# Patient Record
Sex: FEMALE | Race: WHITE | NOT HISPANIC OR LATINO
[De-identification: names, ages, dates, MRNs, and addresses within clinical notes are randomized per-mention and may not be internally consistent; named-entity substitution may affect disease eponyms.]

---

## 2020-08-13 ENCOUNTER — APPOINTMENT (OUTPATIENT)
Dept: OBGYN | Facility: CLINIC | Age: 35
End: 2020-08-13
Payer: COMMERCIAL

## 2020-08-13 VITALS
HEIGHT: 67 IN | SYSTOLIC BLOOD PRESSURE: 120 MMHG | WEIGHT: 170 LBS | BODY MASS INDEX: 26.68 KG/M2 | DIASTOLIC BLOOD PRESSURE: 84 MMHG | RESPIRATION RATE: 18 BRPM | OXYGEN SATURATION: 98 %

## 2020-08-13 DIAGNOSIS — N64.9 DISORDER OF BREAST, UNSPECIFIED: ICD-10-CM

## 2020-08-13 PROBLEM — Z00.00 ENCOUNTER FOR PREVENTIVE HEALTH EXAMINATION: Status: ACTIVE | Noted: 2020-08-13

## 2020-08-13 PROCEDURE — 99213 OFFICE O/P EST LOW 20 MIN: CPT

## 2020-08-13 NOTE — CHIEF COMPLAINT
[FreeTextEntry1] : pt presents with c/o ? discoloration in left breast and is concerned about inflammatory breast ca. pt had mammo/sono in january which was birads1 [Follow Up] : follow up GYN visit

## 2020-08-13 NOTE — PHYSICAL EXAM
[Awake] : awake [Alert] : alert [Acute Distress] : no acute distress [LAD] : lymphadenopathy [Examination Of The Breasts] : a normal appearance [Normal] : normal [No Masses] : no breast masses were palpable

## 2020-08-13 NOTE — DISCUSSION/SUMMARY
[FreeTextEntry1] : reviewed mammo and sono with pt as well as normal breast exam. pt is very anxious regarding breast cancer. advised to see breast surgeon for consult and possible mri/brca screening. Dr. Rivera's information provided

## 2020-08-21 ENCOUNTER — APPOINTMENT (OUTPATIENT)
Dept: BREAST CENTER | Facility: CLINIC | Age: 35
End: 2020-08-21
Payer: COMMERCIAL

## 2020-08-21 VITALS
DIASTOLIC BLOOD PRESSURE: 74 MMHG | HEIGHT: 67 IN | TEMPERATURE: 97.9 F | HEART RATE: 103 BPM | SYSTOLIC BLOOD PRESSURE: 113 MMHG | BODY MASS INDEX: 25.9 KG/M2 | WEIGHT: 165 LBS

## 2020-08-21 DIAGNOSIS — Z72.89 OTHER PROBLEMS RELATED TO LIFESTYLE: ICD-10-CM

## 2020-08-21 DIAGNOSIS — N62 HYPERTROPHY OF BREAST: ICD-10-CM

## 2020-08-21 PROCEDURE — 99203 OFFICE O/P NEW LOW 30 MIN: CPT

## 2020-08-21 NOTE — HISTORY OF PRESENT ILLNESS
[FreeTextEntry1] : Pt is a 33 y/o female here for consultation referred by Dr. Darryl Marcial for breast discoloration for the last week and a half and mild pain 3/10 not requiring pain medication. Denies any breast lesions, discharge or masses. No fever, signs of infection or trauma to breast. Pt is adopted and not aware of FHx.\par Pt is anxious that she may have inflammatory breast cancer. \par --1/10/20 ZP Olayinka DmmgT/US: baseline, no susp findings olayinka .BR1. Clinical management rec for breast pain advised.\par

## 2020-08-21 NOTE — DATA REVIEWED
[FreeTextEntry1] : 1/10/20 ZP Olayinka DmmgT/US: baseline, no susp findings olayinka .BR1. Clinical management rec for breast pain advised.

## 2020-08-21 NOTE — REASON FOR VISIT
[Consultation] : a consultation visit [FreeTextEntry1] : Breast pain/discoloration/redness to Right breast.

## 2020-08-21 NOTE — PHYSICAL EXAM
[Bra Size: ___] : Bra Size: [unfilled] [Atraumatic] : atraumatic [Normocephalic] : normocephalic [Supple] : supple [No Supraclavicular Adenopathy] : no supraclavicular adenopathy [No Thyromegaly] : no thyromegaly [Examined in the supine and seated position] : examined in the supine and seated position [No dominant masses] : no dominant masses left breast [No dominant masses] : no dominant masses in right breast  [No Nipple Retraction] : no left nipple retraction [No Nipple Discharge] : no left nipple discharge [No Axillary Lymphadenopathy] : no left axillary lymphadenopathy [No Edema] : no edema [No Ulceration] : no ulceration [No Rashes] : no rashes [Asymmetrical] : asymmetrical [Full ROM] : full range of motion [No Swelling] : no swelling [Breast Nipple Retraction] : nipples not retracted [Breast Nipple Inversion] : nipples not inverted [Breast Nipple Flattening] : nipples not flattened [Breast Abnormal Lactation (Galactorrhea)] : no galactorrhea [Breast Nipple Fissures] : nipples not fissured [Breast Abnormal Secretion Bloody Fluid] : no bloody discharge [Breast Abnormal Secretion Opalescent Fluid] : no milky discharge [Breast Abnormal Secretion Serous Fluid] : no serous discharge [de-identified] : bilat large pendulous breasts, R<L overall but does not appear acute change.  [de-identified] : no edema, erythema or underlying mass. medial aspect slightly pinker but appears to be along bra lines.

## 2020-08-21 NOTE — ASSESSMENT
[FreeTextEntry1] : Pt is a 33 y/o female here for consultation referred by Dr. Darryl Marcial for breast discoloration for the last week and a half and mild pain 3/10 not requiring pain medication. Denies any breast lesions, discharge or masses. No fever, signs of infection or trauma to breast. Pt is adopted and not aware of FHx.\par Pt is anxious that she may have inflammatory breast cancer. \par --1/10/20 ZP Olayinka DmmgT/US: baseline, no susp findings olayinka .BR1. Clinical management rec for breast pain advised.\par CBE: BIlat pendulous breasts, R<L overall but no edema, erythema, masses.  No evidence of IBC. Slight pinker discoloration along bra contact lines.\par Pt reassured, no suspicious findings.  Slight pinkish discoloration may be because the bra cup on the left may be too small. Discussed her breasts are asymmetrical overall but does not appear to be an acute change. No evidence of IBC. Pt is adopted and does not know her genetic hx so offered genetic testing and COLOR was done. \par Given anxiety and discomfort, can do left DMMG and u/s to see if any radiologic changes in skin thickness. Further f.u pending results of studies.

## 2020-08-21 NOTE — PAST MEDICAL HISTORY
[Menarche Age ____] : age at menarche was [unfilled] [Definite ___ (Date)] : the last menstrual period was [unfilled] [Living ___] : Living: [unfilled] [Total Preg ___] : G[unfilled] [Age At Live Birth ___] : Age at live birth: [unfilled] [Live Births ___] : P[unfilled]  [History of Hormone Replacement Treatment] : has no history of hormone replacement treatment [FreeTextEntry8] : no [FreeTextEntry7] : 1 month

## 2020-08-21 NOTE — REVIEW OF SYSTEMS
[Negative] : Heme/Lymph [Breast Pain] : breast pain [de-identified] : Left breast pain and discoloration

## 2020-09-14 ENCOUNTER — APPOINTMENT (OUTPATIENT)
Dept: ULTRASOUND IMAGING | Facility: CLINIC | Age: 35
End: 2020-09-14
Payer: COMMERCIAL

## 2020-09-14 ENCOUNTER — RESULT REVIEW (OUTPATIENT)
Age: 35
End: 2020-09-14

## 2020-09-14 ENCOUNTER — APPOINTMENT (OUTPATIENT)
Dept: MAMMOGRAPHY | Facility: CLINIC | Age: 35
End: 2020-09-14
Payer: COMMERCIAL

## 2020-09-14 ENCOUNTER — TRANSCRIPTION ENCOUNTER (OUTPATIENT)
Age: 35
End: 2020-09-14

## 2020-09-14 PROCEDURE — 77065 DX MAMMO INCL CAD UNI: CPT | Mod: LT

## 2020-09-14 PROCEDURE — G0279: CPT | Mod: LT

## 2020-09-14 PROCEDURE — 76641 ULTRASOUND BREAST COMPLETE: CPT | Mod: LT

## 2020-10-06 ENCOUNTER — NON-APPOINTMENT (OUTPATIENT)
Age: 35
End: 2020-10-06

## 2020-10-08 ENCOUNTER — APPOINTMENT (OUTPATIENT)
Dept: BREAST CENTER | Facility: CLINIC | Age: 35
End: 2020-10-08
Payer: COMMERCIAL

## 2020-10-08 VITALS
SYSTOLIC BLOOD PRESSURE: 142 MMHG | WEIGHT: 165 LBS | DIASTOLIC BLOOD PRESSURE: 96 MMHG | HEART RATE: 91 BPM | TEMPERATURE: 97.6 F | BODY MASS INDEX: 25.9 KG/M2 | HEIGHT: 67 IN

## 2020-10-08 DIAGNOSIS — N64.4 MASTODYNIA: ICD-10-CM

## 2020-10-08 PROCEDURE — 99213 OFFICE O/P EST LOW 20 MIN: CPT

## 2020-10-08 NOTE — PAST MEDICAL HISTORY
[History of Hormone Replacement Treatment] : has no history of hormone replacement treatment [FreeTextEntry7] : 1 month [FreeTextEntry8] : no

## 2020-10-08 NOTE — ASSESSMENT
[FreeTextEntry1] : Pt is a 35 y/o female here for here for genetic testing. Prior sample unsatisfactory. Denies any breast lesions, discharge or masses. No new breast complaints. No reported breast discoloration or c/o pain.\par Pt is adopted and not aware of FHx.\par Pt is anxious that she may have inflammatory breast cancer. \par \par --9/14/20 Janelle L Dmmg/US: compared to 1/10/20, dense, no susp findings on mmg/US. BR1.\par --1/10/20 ZP Olayinka DmmgT/US: baseline, no susp findings olayinka .BR1. Clinical management rec for breast pain advised.\par Genetic testing procedure reviewed.

## 2020-10-08 NOTE — HISTORY OF PRESENT ILLNESS
[FreeTextEntry1] : Pt is a 33 y/o female here for here for genetic testing. Prior sample unsatisfactory. Denies any breast lesions, discharge or masses. No new breast complaints. No reported breast discoloration or c/o pain.\par Pt is adopted and not aware of FHx.\par Pt is anxious that she may have inflammatory breast cancer. \par \par --9/14/20 Janelle L Dmmg/US: compared to 1/10/20, dense, no susp findings on mmg/US. BR1.\par --1/10/20 ZP Olayinka DmmgT/US: baseline, no susp findings olayinka .BR1. Clinical management rec for breast pain advised.

## 2020-10-08 NOTE — DATA REVIEWED
[FreeTextEntry1] : 9/14/20 Janelle ALVAREZ Dmmg/US: compared to 1/10/20, dense, no susp findings on mmg/US. BR1.\par \par

## 2021-02-03 ENCOUNTER — APPOINTMENT (OUTPATIENT)
Dept: ANTEPARTUM | Facility: CLINIC | Age: 36
End: 2021-02-03
Payer: COMMERCIAL

## 2021-02-03 ENCOUNTER — APPOINTMENT (OUTPATIENT)
Dept: OBGYN | Facility: CLINIC | Age: 36
End: 2021-02-03
Payer: COMMERCIAL

## 2021-02-03 ENCOUNTER — ASOB RESULT (OUTPATIENT)
Age: 36
End: 2021-02-03

## 2021-02-03 LAB
HCG UR QL: POSITIVE
QUALITY CONTROL: YES

## 2021-02-03 PROCEDURE — 76801 OB US < 14 WKS SINGLE FETUS: CPT

## 2021-02-03 PROCEDURE — 99214 OFFICE O/P EST MOD 30 MIN: CPT

## 2021-02-03 PROCEDURE — 81025 URINE PREGNANCY TEST: CPT

## 2021-02-03 PROCEDURE — 76817 TRANSVAGINAL US OBSTETRIC: CPT

## 2021-02-03 PROCEDURE — 99072 ADDL SUPL MATRL&STAF TM PHE: CPT

## 2021-02-03 NOTE — PLAN
[FreeTextEntry1] : had long conversation with pt regarding sab and possible causes. pt offered expectant management vs suction d&c. r/b of both advised\par \par pt to discuss with  and call with decision

## 2021-02-17 ENCOUNTER — APPOINTMENT (OUTPATIENT)
Dept: OBGYN | Facility: CLINIC | Age: 36
End: 2021-02-17
Payer: COMMERCIAL

## 2021-02-17 VITALS — TEMPERATURE: 97 F

## 2021-02-17 DIAGNOSIS — O02.1 MISSED ABORTION: ICD-10-CM

## 2021-02-17 PROCEDURE — 99214 OFFICE O/P EST MOD 30 MIN: CPT

## 2021-02-17 PROCEDURE — 99072 ADDL SUPL MATRL&STAF TM PHE: CPT

## 2021-02-17 NOTE — PLAN
[FreeTextEntry1] : hcg to lab\par again discussed sab and potential causes. again offered d&e which pt is declining. will call with blood results

## 2021-02-18 LAB — HCG SERPL-MCNC: 7266 MIU/ML

## 2021-02-24 ENCOUNTER — ASOB RESULT (OUTPATIENT)
Age: 36
End: 2021-02-24

## 2021-02-24 ENCOUNTER — APPOINTMENT (OUTPATIENT)
Dept: ANTEPARTUM | Facility: CLINIC | Age: 36
End: 2021-02-24
Payer: COMMERCIAL

## 2021-02-24 ENCOUNTER — APPOINTMENT (OUTPATIENT)
Dept: OBGYN | Facility: CLINIC | Age: 36
End: 2021-02-24
Payer: COMMERCIAL

## 2021-02-24 DIAGNOSIS — O03.9 COMPLETE OR UNSPECIFIED SPONTANEOUS ABORTION W/OUT COMPLICATION: ICD-10-CM

## 2021-02-24 PROCEDURE — 99072 ADDL SUPL MATRL&STAF TM PHE: CPT

## 2021-02-24 PROCEDURE — 99213 OFFICE O/P EST LOW 20 MIN: CPT

## 2021-02-24 PROCEDURE — 76830 TRANSVAGINAL US NON-OB: CPT | Mod: 59

## 2021-02-24 PROCEDURE — 76856 US EXAM PELVIC COMPLETE: CPT | Mod: 59

## 2021-02-24 NOTE — PLAN
[FreeTextEntry1] : caprice discussed. precautions reviewed. will get blood work and call with results

## 2021-02-24 NOTE — HISTORY OF PRESENT ILLNESS
[FreeTextEntry1] : pt states she passed a large amount of tissue over the weekend. still has small amt of blood but mostly subsiding

## 2021-02-25 LAB — ABO + RH PNL BLD: NORMAL

## 2021-02-26 LAB — HCG SERPL-MCNC: 299 MIU/ML

## 2021-06-01 ENCOUNTER — NON-APPOINTMENT (OUTPATIENT)
Age: 36
End: 2021-06-01

## 2023-02-08 ENCOUNTER — ASOB RESULT (OUTPATIENT)
Age: 38
End: 2023-02-08

## 2023-02-08 ENCOUNTER — APPOINTMENT (OUTPATIENT)
Dept: OBGYN | Facility: CLINIC | Age: 38
End: 2023-02-08
Payer: COMMERCIAL

## 2023-02-08 ENCOUNTER — APPOINTMENT (OUTPATIENT)
Dept: ANTEPARTUM | Facility: CLINIC | Age: 38
End: 2023-02-08
Payer: COMMERCIAL

## 2023-02-08 VITALS
WEIGHT: 185 LBS | DIASTOLIC BLOOD PRESSURE: 70 MMHG | BODY MASS INDEX: 29.03 KG/M2 | SYSTOLIC BLOOD PRESSURE: 132 MMHG | HEIGHT: 67 IN

## 2023-02-08 DIAGNOSIS — N92.6 IRREGULAR MENSTRUATION, UNSPECIFIED: ICD-10-CM

## 2023-02-08 PROCEDURE — 76801 OB US < 14 WKS SINGLE FETUS: CPT

## 2023-02-08 PROCEDURE — 99214 OFFICE O/P EST MOD 30 MIN: CPT

## 2023-02-08 NOTE — PLAN
[FreeTextEntry1] : pregnancy precautions reviewed. sono discussed. advised pnv\par f/u 1 week for nob or prn. unsure is she wants nipt or not

## 2023-02-14 ENCOUNTER — NON-APPOINTMENT (OUTPATIENT)
Age: 38
End: 2023-02-14

## 2023-02-15 ENCOUNTER — APPOINTMENT (OUTPATIENT)
Dept: OBGYN | Facility: CLINIC | Age: 38
End: 2023-02-15
Payer: COMMERCIAL

## 2023-02-15 ENCOUNTER — APPOINTMENT (OUTPATIENT)
Dept: ANTEPARTUM | Facility: CLINIC | Age: 38
End: 2023-02-15

## 2023-02-15 VITALS
WEIGHT: 185 LBS | DIASTOLIC BLOOD PRESSURE: 90 MMHG | BODY MASS INDEX: 29.03 KG/M2 | SYSTOLIC BLOOD PRESSURE: 150 MMHG | HEIGHT: 67 IN

## 2023-02-15 VITALS — SYSTOLIC BLOOD PRESSURE: 132 MMHG | DIASTOLIC BLOOD PRESSURE: 88 MMHG

## 2023-02-15 DIAGNOSIS — Z34.91 ENCOUNTER FOR SUPERVISION OF NORMAL PREGNANCY, UNSPECIFIED, FIRST TRIMESTER: ICD-10-CM

## 2023-02-15 PROCEDURE — 0501F PRENATAL FLOW SHEET: CPT

## 2023-02-16 LAB
ABO + RH PNL BLD: NORMAL
BASOPHILS # BLD AUTO: 0.03 K/UL
BASOPHILS NFR BLD AUTO: 0.3 %
BLD GP AB SCN SERPL QL: NORMAL
EOSINOPHIL # BLD AUTO: 0.03 K/UL
EOSINOPHIL NFR BLD AUTO: 0.3 %
HCT VFR BLD CALC: 43.6 %
HGB BLD-MCNC: 14.8 G/DL
HIV1+2 AB SPEC QL IA.RAPID: NONREACTIVE
IMM GRANULOCYTES NFR BLD AUTO: 0.3 %
LYMPHOCYTES # BLD AUTO: 1.58 K/UL
LYMPHOCYTES NFR BLD AUTO: 14.6 %
MAN DIFF?: NORMAL
MCHC RBC-ENTMCNC: 31.6 PG
MCHC RBC-ENTMCNC: 33.9 GM/DL
MCV RBC AUTO: 93.2 FL
MONOCYTES # BLD AUTO: 0.4 K/UL
MONOCYTES NFR BLD AUTO: 3.7 %
NEUTROPHILS # BLD AUTO: 8.74 K/UL
NEUTROPHILS NFR BLD AUTO: 80.8 %
PLATELET # BLD AUTO: 285 K/UL
RBC # BLD: 4.68 M/UL
RBC # FLD: 12.3 %
WBC # FLD AUTO: 10.81 K/UL

## 2023-02-17 LAB
HBV SURFACE AB SER QL: REACTIVE
HBV SURFACE AG SER QL: NONREACTIVE
HCV AB SER QL: NONREACTIVE
HCV S/CO RATIO: 0.05 S/CO
HGB A MFR BLD: 97.4 %
HGB A2 MFR BLD: 2.6 %
HGB FRACT BLD-IMP: NORMAL
MEV IGG FLD QL IA: 18.1 AU/ML
MEV IGG+IGM SER-IMP: POSITIVE
RUBV IGG FLD-ACNC: 1 INDEX
RUBV IGG SER-IMP: NORMAL
T GONDII AB SER-IMP: NEGATIVE
T GONDII AB SER-IMP: NEGATIVE
T GONDII IGG SER QL: <3 IU/ML
T GONDII IGM SER QL: <3 AU/ML
T PALLIDUM AB SER QL IA: NEGATIVE
VZV AB TITR SER: POSITIVE
VZV IGG SER IF-ACNC: 956.1 INDEX

## 2023-02-23 LAB
B19V IGG SER QL IA: 0.45 INDEX
B19V IGG+IGM SER-IMP: NEGATIVE
B19V IGG+IGM SER-IMP: NORMAL
B19V IGM FLD-ACNC: 0.14 INDEX
B19V IGM SER-ACNC: NEGATIVE

## 2023-03-01 LAB
AR GENE MUT ANL BLD/T: NEGATIVE
CFTR MUT TESTED BLD/T: NEGATIVE
GENE DIS ANL CARRIER INTERP-IMP: NEGATIVE
SMN1 GENE MUT ANL BLD/T: NORMAL

## 2023-03-14 ENCOUNTER — APPOINTMENT (OUTPATIENT)
Dept: OBGYN | Facility: CLINIC | Age: 38
End: 2023-03-14
Payer: COMMERCIAL

## 2023-03-14 VITALS
SYSTOLIC BLOOD PRESSURE: 140 MMHG | DIASTOLIC BLOOD PRESSURE: 70 MMHG | WEIGHT: 188 LBS | BODY MASS INDEX: 29.51 KG/M2 | HEIGHT: 67 IN

## 2023-03-14 DIAGNOSIS — Z34.92 ENCOUNTER FOR SUPERVISION OF NORMAL PREGNANCY, UNSPECIFIED, SECOND TRIMESTER: ICD-10-CM

## 2023-03-14 PROCEDURE — 0502F SUBSEQUENT PRENATAL CARE: CPT

## 2023-03-19 LAB
AFP MOM: 1.15
AFP VALUE: 37.7 NG/ML
ALPHA FETOPROTEIN SERUM COMMENT: NORMAL
ALPHA FETOPROTEIN SERUM INTERPRETATION: NORMAL
ALPHA FETOPROTEIN SERUM RESULTS: NORMAL
ALPHA FETOPROTEIN SERUM TEST RESULTS: NORMAL
GESTATIONAL AGE BASED ON: NORMAL
GESTATIONAL AGE ON COLLECTION DATE: 16.7 WEEKS
INSULIN DEP DIABETES: NO
MATERNAL AGE AT EDD AFP: 39.7 YR
MULTIPLE GESTATION: NO
OSBR RISK 1 IN: 7603
RACE: NORMAL
WEIGHT AFP: 188 LBS

## 2023-04-12 ENCOUNTER — ASOB RESULT (OUTPATIENT)
Age: 38
End: 2023-04-12

## 2023-04-12 ENCOUNTER — APPOINTMENT (OUTPATIENT)
Dept: OBGYN | Facility: CLINIC | Age: 38
End: 2023-04-12
Payer: COMMERCIAL

## 2023-04-12 ENCOUNTER — APPOINTMENT (OUTPATIENT)
Dept: ANTEPARTUM | Facility: CLINIC | Age: 38
End: 2023-04-12
Payer: COMMERCIAL

## 2023-04-12 VITALS
WEIGHT: 187 LBS | DIASTOLIC BLOOD PRESSURE: 70 MMHG | SYSTOLIC BLOOD PRESSURE: 110 MMHG | BODY MASS INDEX: 29.35 KG/M2 | HEIGHT: 67 IN

## 2023-04-12 PROCEDURE — 76811 OB US DETAILED SNGL FETUS: CPT

## 2023-04-12 PROCEDURE — 76817 TRANSVAGINAL US OBSTETRIC: CPT | Mod: 59

## 2023-04-12 PROCEDURE — 0502F SUBSEQUENT PRENATAL CARE: CPT

## 2023-04-14 ENCOUNTER — NON-APPOINTMENT (OUTPATIENT)
Age: 38
End: 2023-04-14

## 2023-05-10 ENCOUNTER — APPOINTMENT (OUTPATIENT)
Dept: OBGYN | Facility: CLINIC | Age: 38
End: 2023-05-10
Payer: COMMERCIAL

## 2023-05-10 VITALS
SYSTOLIC BLOOD PRESSURE: 128 MMHG | DIASTOLIC BLOOD PRESSURE: 70 MMHG | BODY MASS INDEX: 29.82 KG/M2 | HEIGHT: 67 IN | WEIGHT: 190 LBS

## 2023-05-10 PROCEDURE — 0502F SUBSEQUENT PRENATAL CARE: CPT

## 2023-05-31 ENCOUNTER — APPOINTMENT (OUTPATIENT)
Dept: OBGYN | Facility: CLINIC | Age: 38
End: 2023-05-31
Payer: COMMERCIAL

## 2023-05-31 ENCOUNTER — LABORATORY RESULT (OUTPATIENT)
Age: 38
End: 2023-05-31

## 2023-05-31 ENCOUNTER — APPOINTMENT (OUTPATIENT)
Dept: ANTEPARTUM | Facility: CLINIC | Age: 38
End: 2023-05-31
Payer: COMMERCIAL

## 2023-05-31 ENCOUNTER — ASOB RESULT (OUTPATIENT)
Age: 38
End: 2023-05-31

## 2023-05-31 VITALS
DIASTOLIC BLOOD PRESSURE: 70 MMHG | HEIGHT: 67 IN | WEIGHT: 191 LBS | SYSTOLIC BLOOD PRESSURE: 150 MMHG | BODY MASS INDEX: 29.98 KG/M2

## 2023-05-31 PROCEDURE — 76816 OB US FOLLOW-UP PER FETUS: CPT

## 2023-05-31 PROCEDURE — 0502F SUBSEQUENT PRENATAL CARE: CPT

## 2023-06-01 LAB
ABO + RH PNL BLD: NORMAL
GLUCOSE 1H P 100 G GLC PO SERPL-MCNC: 140 MG/DL

## 2023-06-14 ENCOUNTER — APPOINTMENT (OUTPATIENT)
Dept: OBGYN | Facility: CLINIC | Age: 38
End: 2023-06-14
Payer: COMMERCIAL

## 2023-06-14 VITALS
BODY MASS INDEX: 30.29 KG/M2 | WEIGHT: 193 LBS | DIASTOLIC BLOOD PRESSURE: 68 MMHG | SYSTOLIC BLOOD PRESSURE: 120 MMHG | HEIGHT: 67 IN

## 2023-06-14 PROCEDURE — 0502F SUBSEQUENT PRENATAL CARE: CPT

## 2023-06-28 ENCOUNTER — APPOINTMENT (OUTPATIENT)
Dept: OBGYN | Facility: CLINIC | Age: 38
End: 2023-06-28
Payer: COMMERCIAL

## 2023-06-28 VITALS
BODY MASS INDEX: 30.76 KG/M2 | WEIGHT: 196 LBS | DIASTOLIC BLOOD PRESSURE: 76 MMHG | HEIGHT: 67 IN | SYSTOLIC BLOOD PRESSURE: 130 MMHG

## 2023-06-28 PROCEDURE — 0502F SUBSEQUENT PRENATAL CARE: CPT

## 2023-07-13 ENCOUNTER — OUTPATIENT (OUTPATIENT)
Dept: INPATIENT UNIT | Facility: HOSPITAL | Age: 38
LOS: 1 days | End: 2023-07-13
Payer: COMMERCIAL

## 2023-07-13 ENCOUNTER — APPOINTMENT (OUTPATIENT)
Dept: OBGYN | Facility: CLINIC | Age: 38
End: 2023-07-13
Payer: COMMERCIAL

## 2023-07-13 VITALS — DIASTOLIC BLOOD PRESSURE: 64 MMHG | HEART RATE: 112 BPM | SYSTOLIC BLOOD PRESSURE: 122 MMHG

## 2023-07-13 VITALS — SYSTOLIC BLOOD PRESSURE: 141 MMHG | HEART RATE: 127 BPM | DIASTOLIC BLOOD PRESSURE: 75 MMHG

## 2023-07-13 VITALS
SYSTOLIC BLOOD PRESSURE: 160 MMHG | WEIGHT: 198 LBS | BODY MASS INDEX: 31.08 KG/M2 | HEIGHT: 67 IN | DIASTOLIC BLOOD PRESSURE: 100 MMHG

## 2023-07-13 DIAGNOSIS — O26.899 OTHER SPECIFIED PREGNANCY RELATED CONDITIONS, UNSPECIFIED TRIMESTER: ICD-10-CM

## 2023-07-13 DIAGNOSIS — K08.409 PARTIAL LOSS OF TEETH, UNSPECIFIED CAUSE, UNSPECIFIED CLASS: Chronic | ICD-10-CM

## 2023-07-13 LAB
ALBUMIN SERPL ELPH-MCNC: 4 G/DL — SIGNIFICANT CHANGE UP (ref 3.3–5.2)
ALP SERPL-CCNC: 101 U/L — SIGNIFICANT CHANGE UP (ref 40–120)
ALT FLD-CCNC: 11 U/L — SIGNIFICANT CHANGE UP
ANION GAP SERPL CALC-SCNC: 15 MMOL/L — SIGNIFICANT CHANGE UP (ref 5–17)
APPEARANCE UR: CLEAR — SIGNIFICANT CHANGE UP
APTT BLD: 25.7 SEC — LOW (ref 27.5–35.5)
AST SERPL-CCNC: 15 U/L — SIGNIFICANT CHANGE UP
BACTERIA # UR AUTO: ABNORMAL
BASOPHILS # BLD AUTO: 0.02 K/UL — SIGNIFICANT CHANGE UP (ref 0–0.2)
BASOPHILS NFR BLD AUTO: 0.2 % — SIGNIFICANT CHANGE UP (ref 0–2)
BILIRUB SERPL-MCNC: 0.3 MG/DL — LOW (ref 0.4–2)
BILIRUB UR-MCNC: NEGATIVE — SIGNIFICANT CHANGE UP
BUN SERPL-MCNC: 7.6 MG/DL — LOW (ref 8–20)
CALCIUM SERPL-MCNC: 9.5 MG/DL — SIGNIFICANT CHANGE UP (ref 8.4–10.5)
CHLORIDE SERPL-SCNC: 104 MMOL/L — SIGNIFICANT CHANGE UP (ref 96–108)
CO2 SERPL-SCNC: 19 MMOL/L — LOW (ref 22–29)
COLOR SPEC: YELLOW — SIGNIFICANT CHANGE UP
CREAT ?TM UR-MCNC: 13 MG/DL — SIGNIFICANT CHANGE UP
CREAT SERPL-MCNC: 0.5 MG/DL — SIGNIFICANT CHANGE UP (ref 0.5–1.3)
DIFF PNL FLD: NEGATIVE — SIGNIFICANT CHANGE UP
EGFR: 122 ML/MIN/1.73M2 — SIGNIFICANT CHANGE UP
EOSINOPHIL # BLD AUTO: 0.06 K/UL — SIGNIFICANT CHANGE UP (ref 0–0.5)
EOSINOPHIL NFR BLD AUTO: 0.6 % — SIGNIFICANT CHANGE UP (ref 0–6)
EPI CELLS # UR: SIGNIFICANT CHANGE UP
FIBRINOGEN PPP-MCNC: 585 MG/DL — HIGH (ref 200–450)
GLUCOSE SERPL-MCNC: 93 MG/DL — SIGNIFICANT CHANGE UP (ref 70–99)
GLUCOSE UR QL: NEGATIVE MG/DL — SIGNIFICANT CHANGE UP
HCT VFR BLD CALC: 39.1 % — SIGNIFICANT CHANGE UP (ref 34.5–45)
HGB BLD-MCNC: 13.7 G/DL — SIGNIFICANT CHANGE UP (ref 11.5–15.5)
IMM GRANULOCYTES NFR BLD AUTO: 0.4 % — SIGNIFICANT CHANGE UP (ref 0–0.9)
INR BLD: 0.91 RATIO — SIGNIFICANT CHANGE UP (ref 0.88–1.16)
KETONES UR-MCNC: ABNORMAL
LDH SERPL L TO P-CCNC: 184 U/L — SIGNIFICANT CHANGE UP (ref 98–192)
LEUKOCYTE ESTERASE UR-ACNC: ABNORMAL
LYMPHOCYTES # BLD AUTO: 1.47 K/UL — SIGNIFICANT CHANGE UP (ref 1–3.3)
LYMPHOCYTES # BLD AUTO: 15.8 % — SIGNIFICANT CHANGE UP (ref 13–44)
MCHC RBC-ENTMCNC: 31.6 PG — SIGNIFICANT CHANGE UP (ref 27–34)
MCHC RBC-ENTMCNC: 35 GM/DL — SIGNIFICANT CHANGE UP (ref 32–36)
MCV RBC AUTO: 90.3 FL — SIGNIFICANT CHANGE UP (ref 80–100)
MONOCYTES # BLD AUTO: 0.46 K/UL — SIGNIFICANT CHANGE UP (ref 0–0.9)
MONOCYTES NFR BLD AUTO: 5 % — SIGNIFICANT CHANGE UP (ref 2–14)
NEUTROPHILS # BLD AUTO: 7.23 K/UL — SIGNIFICANT CHANGE UP (ref 1.8–7.4)
NEUTROPHILS NFR BLD AUTO: 78 % — HIGH (ref 43–77)
NITRITE UR-MCNC: NEGATIVE — SIGNIFICANT CHANGE UP
PH UR: 7 — SIGNIFICANT CHANGE UP (ref 5–8)
PLATELET # BLD AUTO: 200 K/UL — SIGNIFICANT CHANGE UP (ref 150–400)
POTASSIUM SERPL-MCNC: 3.9 MMOL/L — SIGNIFICANT CHANGE UP (ref 3.5–5.3)
POTASSIUM SERPL-SCNC: 3.9 MMOL/L — SIGNIFICANT CHANGE UP (ref 3.5–5.3)
PROT ?TM UR-MCNC: <4 MG/DL — SIGNIFICANT CHANGE UP (ref 0–12)
PROT SERPL-MCNC: 6.7 G/DL — SIGNIFICANT CHANGE UP (ref 6.6–8.7)
PROT UR-MCNC: NEGATIVE — SIGNIFICANT CHANGE UP
PROT/CREAT UR-RTO: <0.3 RATIO — HIGH
PROTHROM AB SERPL-ACNC: 10.6 SEC — SIGNIFICANT CHANGE UP (ref 10.5–13.4)
RBC # BLD: 4.33 M/UL — SIGNIFICANT CHANGE UP (ref 3.8–5.2)
RBC # FLD: 13.1 % — SIGNIFICANT CHANGE UP (ref 10.3–14.5)
RBC CASTS # UR COMP ASSIST: SIGNIFICANT CHANGE UP /HPF (ref 0–4)
SODIUM SERPL-SCNC: 138 MMOL/L — SIGNIFICANT CHANGE UP (ref 135–145)
SP GR SPEC: 1 — LOW (ref 1.01–1.02)
URATE SERPL-MCNC: 4.5 MG/DL — SIGNIFICANT CHANGE UP (ref 2.4–5.7)
UROBILINOGEN FLD QL: NEGATIVE MG/DL — SIGNIFICANT CHANGE UP
WBC # BLD: 9.28 K/UL — SIGNIFICANT CHANGE UP (ref 3.8–10.5)
WBC # FLD AUTO: 9.28 K/UL — SIGNIFICANT CHANGE UP (ref 3.8–10.5)
WBC UR QL: ABNORMAL /HPF (ref 0–5)

## 2023-07-13 PROCEDURE — 59025 FETAL NON-STRESS TEST: CPT

## 2023-07-13 PROCEDURE — 83615 LACTATE (LD) (LDH) ENZYME: CPT

## 2023-07-13 PROCEDURE — 85730 THROMBOPLASTIN TIME PARTIAL: CPT

## 2023-07-13 PROCEDURE — 0502F SUBSEQUENT PRENATAL CARE: CPT

## 2023-07-13 PROCEDURE — 82570 ASSAY OF URINE CREATININE: CPT

## 2023-07-13 PROCEDURE — 84156 ASSAY OF PROTEIN URINE: CPT

## 2023-07-13 PROCEDURE — 85025 COMPLETE CBC W/AUTO DIFF WBC: CPT

## 2023-07-13 PROCEDURE — G0463: CPT

## 2023-07-13 PROCEDURE — 36415 COLL VENOUS BLD VENIPUNCTURE: CPT

## 2023-07-13 PROCEDURE — 76819 FETAL BIOPHYS PROFIL W/O NST: CPT | Mod: 26

## 2023-07-13 PROCEDURE — 85384 FIBRINOGEN ACTIVITY: CPT

## 2023-07-13 PROCEDURE — 80053 COMPREHEN METABOLIC PANEL: CPT

## 2023-07-13 PROCEDURE — 59025 FETAL NON-STRESS TEST: CPT | Mod: 26

## 2023-07-13 PROCEDURE — 81001 URINALYSIS AUTO W/SCOPE: CPT

## 2023-07-13 PROCEDURE — 84550 ASSAY OF BLOOD/URIC ACID: CPT

## 2023-07-13 PROCEDURE — 85610 PROTHROMBIN TIME: CPT

## 2023-07-13 PROCEDURE — 99222 1ST HOSP IP/OBS MODERATE 55: CPT | Mod: 25,GC

## 2023-07-13 NOTE — OB PROVIDER TRIAGE NOTE - NSHPLABSRESULTS_GEN_ALL_CORE
Color: Yellow / Appearance: Clear / S.005 / pH: x  Gluc: 93 mg/dL / Ketone: Trace  / Bili: Negative / Urobili: Negative mg/dL   Blood: x / Protein: Negative / Nitrite: Negative   Leuk Esterase: Moderate / RBC: 0-2 /HPF / WBC 6-10 /HPF   Sq Epi: x / Non Sq Epi: x / Bacteria: Occasional    Protein/Creatinine Ratio, Urine (23 @ 13:19)   Total Protein, Random Urine: <4.0 mg/dL  Protein/Creatinine Ratio Calculation: <0.3 RatioWBC Count: 9.28 K/uL    RBC Count: 4.33 M/uL  Hemoglobin: 13.7 g/dL  Hematocrit: 39.1 %  Mean Cell Volume: 90.3 fl  Mean Cell Hemoglobin: 31.6 pg  Mean Cell Hemoglobin Conc: 35.0 gm/dL  Red Cell Distrib Width: 13.1 %  Platelet Count - Automated: 200 K/uL    Lactate Dehydrogenase, Serum: 184 U/LUric Acid (23 @ 13:19)     Uric Acid: 4.5 mg/dLComprehensive Metabolic Panel (23 @ 13:19)     Sodium: 138 mmol/L  Potassium: 3.9 mmol/L  Chloride: 104:   Carbon Dioxide: 19.0 mmol/L  Anion Gap: 15 mmol/L  Blood Urea Nitrogen: 7.6 mg/dL  Creatinine: 0.50 mg/dL  Glucose: 93 mg/dL  Calcium: 9.5 mg/dL  Protein Total: 6.7 g/dL  Albumin: 4.0 g/dL  Bilirubin Total: 0.3 mg/dL  Alkaline Phosphatase: 101 U/L  Aspartate Aminotransferase (AST/SGOT): 15 U/L  Alanine Aminotransferase (ALT/SGPT): 11 U/L  eGFR: 122: Color: Yellow / Appearance: Clear / S.005 / pH: x  Gluc: 93 mg/dL / Ketone: Trace  / Bili: Negative / Urobili: Negative mg/dL   Blood: x / Protein: Negative / Nitrite: Negative   Leuk Esterase: Moderate / RBC: 0-2 /HPF / WBC 6-10 /HPF   Sq Epi: x / Non Sq Epi: x / Bacteria: Occasional    Protein/Creatinine Ratio, Urine (23 @ 13:19)   Total Protein, Random Urine: <4.0 mg/dL  Protein/Creatinine Ratio Calculation: <0.3 Ratio WBC Count: 9.28 K/uL    RBC Count: 4.33 M/uL  Hemoglobin: 13.7 g/dL  Hematocrit: 39.1 %  Mean Cell Volume: 90.3 fl  Mean Cell Hemoglobin: 31.6 pg  Mean Cell Hemoglobin Conc: 35.0 gm/dL  Red Cell Distrib Width: 13.1 %  Platelet Count - Automated: 200 K/uL    Lactate Dehydrogenase, Serum: 184 U/LUric Acid (23 @ 13:19)     Uric Acid: 4.5 mg/dLComprehensive Metabolic Panel (23 @ 13:19)     Sodium: 138 mmol/L  Potassium: 3.9 mmol/L  Chloride: 104:   Carbon Dioxide: 19.0 mmol/L  Anion Gap: 15 mmol/L  Blood Urea Nitrogen: 7.6 mg/dL  Creatinine: 0.50 mg/dL  Glucose: 93 mg/dL  Calcium: 9.5 mg/dL  Protein Total: 6.7 g/dL  Albumin: 4.0 g/dL  Bilirubin Total: 0.3 mg/dL  Alkaline Phosphatase: 101 U/L  Aspartate Aminotransferase (AST/SGOT): 15 U/L  Alanine Aminotransferase (ALT/SGPT): 11 U/L  eGFR: 122:

## 2023-07-13 NOTE — CONSULT NOTE ADULT - ASSESSMENT
37y  at 34w0d presenting for observation of elevated blood pressures    #elevated blood pressures  -BPs elevated; one severe range BP recorded in office across visits at 160/100  -carries diagnosis of gestational hypertension  -PIH labs within normal limits today. p/c 0.23   -recommendation for twice weekly  testing now that she is 34 weeks  -patient to call Dr Marcial's office to coordinate twice weekly  testing with her schedule  -no role for late   steroids at this time    #34 weeks gestation   -NST reactive  -no OB complaints  -Follow up: within 1-3 days with Dr Marcial  -return precautions advised    Dispo: safe for d/c witth close follow up and return precautions      discussed with MFM attending Dr Mackey

## 2023-07-13 NOTE — OB PROVIDER TRIAGE NOTE - NSOBPROVIDERNOTE_OBGYN_ALL_OB_FT
A/P:    Patient is a 38 y/o  at 34w presenting from the office with elevated blood pressures now with a P/C ratio of 0.3 concerning for preeclampsia with severe features on the criteria of severe range blood pressure.     P:  -BPP was 8/8 and FHR is reassuring   -MFM consulted   -Pt has an appt to follow up with Dr. Marcial in 1 week A/P:    Patient is a 38 y/o  at 34w presenting from the office with elevated blood pressures dating back to 12w6d per  record consistent with chronic hypertension with escalating pressures.     P:  -BPP was 8/8 with adequate fluid and FHR is reassuring   -Cosider repeating fetal growth ultrasound  -MFM consulted   -Pt has an appt to follow up with Dr. Marcial in 1 week    Discussed with Dr. Cathleen Huang, MS4 A/P:    Patient is a 36 y/o  at 34w presenting from the office with elevated blood pressures dating back to 12w6d per  record consistent with chronic hypertension with escalating pressures.     P:  -BPP was 8/8 with adequate fluid and FHR is reassuring   -Cosider repeating fetal growth ultrasound  -MFM consulted; recommend twice weekly testing  -Pt has an appt to follow up with Dr. Marcial in 1 week    Discussed with Dr. Cathleen Huang, MS4 A/P:    Patient is a 38 y/o  at 34w presenting from the office with elevated blood pressures dating back to 12w6d per  record consistent with chronic hypertension with escalating pressures.     P:  -BPP was 8/8 with adequate fluid and FHR is reassuring   -Consider repeating fetal growth ultrasound  -MFM consulted; recommend twice weekly testing  -Pt has an appt to follow up with Dr. Marcial in 1 week  -Signs and symptoms of pre-eclampsia reviewed    Discussed with Dr. Cathleen Huang, MS4

## 2023-07-13 NOTE — OB RN TRIAGE NOTE - CURRENT PREGNANCY COMPLICATIONS, OB PROFILE
Gestational Age less than 36 Weeks/Maternal Unknown GBS Gestational Age less than 36 Weeks/Hypertensive Disorder/Maternal Unknown GBS

## 2023-07-13 NOTE — OB PROVIDER TRIAGE NOTE - HISTORY OF PRESENT ILLNESS
37y  at 34w GA who presents to L&D for high blood pressures in the office. Patient states that she has had high pressures in the office in the past, but that she takes them at home and they have always been 120/80s. She denies having checked them in the past 2 weeks. In the office today her pressures were 160/100 and 150/90 a few minutes apart. Records show that that patient has has elevates pressures over numerous office visits in the 140s and 150s systolic since early pregnancy. The patient denies any headaches, blurry vision, shortness of breath, RUQ pain, or increased swelling over the past few weeks. She also denies foamy urine. She has not experienced any vaginal bleeding, contractions or leakage of fluid. She endorses good fetal movement. Denies fevers, chills, nausea, vomiting, dysuria, or dizziness. No other complaints at this time.     AILEEN: 23  LMP: 22      Obhx:  G1-  , uncomplicated   G2- SAB at 9 weeks   Gynhx: -fibroids, -ovarian cysts, denies STD hx, hx of abnormal PAP in 2016 with normal results subsequently   PMH: Denies  PSH: Denies  Soc hx: Denies EtOH, tobacco and illicit drug use during this pregnancy  Anx/dep:  Denies  Meds: PNV  Allergies: NKDA       37y  at 34w GA who presents to L&D for high blood pressures in the office. Patient states that she has had high pressures in the office in the past, but that she takes them at home and they have always been 120/80s. She denies having checked them in the past 2 weeks. In the office today her pressures were 160/100 and 150/90 a few minutes apart. Records show that that patient has has elevates pressures over numerous office visits in the 140s and 150s systolic since early pregnancy. The patient denies any headaches, blurry vision, shortness of breath, RUQ pain, or increased swelling over the past few weeks. She also denies foamy urine. She has not experienced any vaginal bleeding, contractions or leakage of fluid. She endorses good fetal movement. Denies fevers, chills, nausea, vomiting, dysuria, or dizziness. No other complaints at this time.     AILEEN: 23  LMP: 22      Obhx:  G1-  , uncomplicated   G2- SAB at 9 weeks   Gynhx: -fibroids, -ovarian cysts, denies STD hx, hx of abnormal PAP in  with normal results subsequently   PMH: Denies  PSH: Denies  Soc hx: Denies EtOH, tobacco and illicit drug use during this pregnancy  Anx/dep:  +anxiety never on meds  Meds: PNV  Allergies: NKDA       37y  at 34w GA who presents to L&D for high blood pressures in the office. Patient states that she has had high pressures in the office in the past, but that she takes them at home and they have always been 120/80s. She denies having checked them in the past 2 weeks. In the office today her pressures were 160/100 and 150/90 a few minutes apart. Records show that that patient has has elevated pressures over numerous office visits in the 140s and 150s systolic since early pregnancy. The patient denies any headaches, blurry vision, shortness of breath, RUQ pain, or increased swelling over the past few weeks. She also denies foamy urine. She has not experienced any vaginal bleeding, contractions or leakage of fluid. She endorses good fetal movement. Denies fevers, chills, nausea, vomiting, dysuria, or dizziness. No other complaints at this time.     AILEEN: 23  LMP: 22      Obhx:  G1-  , uncomplicated   G2- SAB at 9 weeks   Gynhx: -fibroids, -ovarian cysts, denies STD hx, hx of abnormal PAP in  with normal results subsequently   PMH: Denies  PSH: Denies  Soc hx: Denies EtOH, tobacco and illicit drug use during this pregnancy  Anx/dep:  +anxiety never on meds  Meds: PNV  Allergies: NKDA

## 2023-07-13 NOTE — CONSULT NOTE ADULT - NSCONSULTADDITIONALINFOA_GEN_ALL_CORE
MFM - Patient care reviewed with OB team.  BP meets criteria for gestational hypertension.  No clinical symptoms to support preeclampsia.  Laboratory evaluation normal with urine p:c ratio <3 (confirmed with lab).  Fetus AGA with reassuring testing.  Advise continued home BP monitoring, OB visit week, fetal testing with MFM twice weekly, and delivery at 37 weeks unless maternal or fetal indication for earlier intervention.    Sandy Mackey MD  Maternal Fetal Medicine

## 2023-07-13 NOTE — CONSULT NOTE ADULT - SUBJECTIVE AND OBJECTIVE BOX
37y  at 34w0d GA by LMP who presents to L&D for high blood pressures in the office. Patient states that she has had high pressures in the office in the past, but that she takes them at home and they have always been 120/80s. She denies having checked them in the past 2 weeks. In the office today her pressures were 160/100 and 150/90 a few minutes apart.     Upon Chart review of Dr Marcial's office, patient has had elevated pressures over numerous office visits in the 140s and 150s systolic on different visits.     The patient denies any headaches, blurry vision, shortness of breath, RUQ pain, or increased swelling over the past few weeks.   She has not experienced any vaginal bleeding, contractions or leakage of fluid. She endorses good fetal movement. Denies fevers, chills, nausea, vomiting, dysuria, or dizziness. No other complaints at this time.     AILEEN: 23  LMP: 22      Obhx:  G1-  2018, uncomplicated   G2- SAB at 9 weeks   Gynhx: -fibroids, -ovarian cysts, denies STD hx, hx of abnormal PAP in  with normal results subsequently   PMH: Denies  PSH: oral surgery  Soc hx: Denies EtOH, tobacco and illicit drug use during this pregnancy  Anx/dep:  +anxiety never on meds  Meds: PNV  Allergies: NKDA    Vital Signs Last 24 Hrs  T(C): 36.8 (2023 12:53), Max: 36.8 (2023 12:53)  T(F): 98.2 (2023 12:53), Max: 98.2 (2023 12:53)  HR: 112 (2023 15:51) (112 - 129)  BP: 122/64 (2023 15:51) (116/59 - 141/75)  RR: 16 (2023 12:53) (16 - 16)      Parameters below as of 2023 12:53  Patient On (Oxygen Delivery Method): room air      Gen: no acute distress  CV: regular rate and rhythm  Pulm: clear bilaterally to auscultation  Abd: nontender; gravid  Ext: no calf tenderness; +1 swelling     Gen: NAD, well-appearing, AAOx3   Abd: Soft, gravid  Ext: non-tender, non-edematous    Bedside sono: breech, anterior placenta, BPP 8/8, WENDY 24.4  FHT: baseline 145, moderate variability, no acels or decels   [place]: edwige irregularly                          13.7   9.28  )-----------( 200      ( 2023 13:19 )             39.1           138  |  104  |  7.6<L>  ----------------------------<  93  3.9   |  19.0<L>  |  0.50    Ca    9.5      2023 13:19    TPro  6.7  /  Alb  4.0  /  TBili  0.3<L>  /  DBili  x   /  AST  15  /  ALT  11  /  AlkPhos  101  07-

## 2023-07-13 NOTE — OB PROVIDER TRIAGE NOTE - NSHPPHYSICALEXAM_GEN_ALL_CORE
T(C): 36.8 (07-13-23 @ 12:53), Max: 36.8 (07-13-23 @ 12:53)  HR: 129 (07-13-23 @ 14:36) (123 - 129)  BP: 123/63 (07-13-23 @ 14:36) (116/59 - 141/75)  RR: 16 (07-13-23 @ 12:53) (16 - 16)  SpO2: --    Gen: NAD, well-appearing, AAOx3   Abd: Soft, gravid  Ext: non-tender, non-edematous  Bedside sono: breech, anterior placenta   FHT: baseline 145, moderate variability, no acels or decels   [place]: edwige irregularly

## 2023-07-13 NOTE — OB PROVIDER TRIAGE NOTE - ATTENDING COMMENTS
37y  at 34w GA who presents to L&D for with probable chronic hypertension now with high blood pressures not persistently severe and with no s/s pre-eclampsia. Appreciate MFM input, continue outpatient management but increase to twice weekly testing.  Impression NST: reactive

## 2023-07-14 DIAGNOSIS — O09.523 SUPERVISION OF ELDERLY MULTIGRAVIDA, THIRD TRIMESTER: ICD-10-CM

## 2023-07-14 DIAGNOSIS — O09.293 SUPERVISION OF PREGNANCY WITH OTHER POOR REPRODUCTIVE OR OBSTETRIC HISTORY, THIRD TRIMESTER: ICD-10-CM

## 2023-07-14 DIAGNOSIS — F41.9 ANXIETY DISORDER, UNSPECIFIED: ICD-10-CM

## 2023-07-14 DIAGNOSIS — Z3A.34 34 WEEKS GESTATION OF PREGNANCY: ICD-10-CM

## 2023-07-14 DIAGNOSIS — O99.343 OTHER MENTAL DISORDERS COMPLICATING PREGNANCY, THIRD TRIMESTER: ICD-10-CM

## 2023-07-14 DIAGNOSIS — O13.3 GESTATIONAL [PREGNANCY-INDUCED] HYPERTENSION WITHOUT SIGNIFICANT PROTEINURIA, THIRD TRIMESTER: ICD-10-CM

## 2023-07-17 ENCOUNTER — APPOINTMENT (OUTPATIENT)
Dept: OBGYN | Facility: CLINIC | Age: 38
End: 2023-07-17
Payer: COMMERCIAL

## 2023-07-17 ENCOUNTER — APPOINTMENT (OUTPATIENT)
Dept: ANTEPARTUM | Facility: CLINIC | Age: 38
End: 2023-07-17
Payer: COMMERCIAL

## 2023-07-17 ENCOUNTER — ASOB RESULT (OUTPATIENT)
Age: 38
End: 2023-07-17

## 2023-07-17 VITALS — DIASTOLIC BLOOD PRESSURE: 68 MMHG | SYSTOLIC BLOOD PRESSURE: 140 MMHG

## 2023-07-17 VITALS — WEIGHT: 196 LBS | BODY MASS INDEX: 30.76 KG/M2 | HEIGHT: 67 IN

## 2023-07-17 PROCEDURE — 76816 OB US FOLLOW-UP PER FETUS: CPT

## 2023-07-17 PROCEDURE — 76819 FETAL BIOPHYS PROFIL W/O NST: CPT

## 2023-07-17 PROCEDURE — 76821 MIDDLE CEREBRAL ARTERY ECHO: CPT | Mod: 59

## 2023-07-17 PROCEDURE — 0502F SUBSEQUENT PRENATAL CARE: CPT

## 2023-07-19 ENCOUNTER — APPOINTMENT (OUTPATIENT)
Dept: OBGYN | Facility: CLINIC | Age: 38
End: 2023-07-19

## 2023-07-21 ENCOUNTER — APPOINTMENT (OUTPATIENT)
Dept: OBGYN | Facility: CLINIC | Age: 38
End: 2023-07-21
Payer: COMMERCIAL

## 2023-07-21 VITALS
WEIGHT: 197 LBS | SYSTOLIC BLOOD PRESSURE: 130 MMHG | HEIGHT: 67 IN | DIASTOLIC BLOOD PRESSURE: 72 MMHG | BODY MASS INDEX: 30.92 KG/M2

## 2023-07-21 PROCEDURE — 0502F SUBSEQUENT PRENATAL CARE: CPT

## 2023-07-24 ENCOUNTER — APPOINTMENT (OUTPATIENT)
Dept: ANTEPARTUM | Facility: CLINIC | Age: 38
End: 2023-07-24
Payer: COMMERCIAL

## 2023-07-24 ENCOUNTER — ASOB RESULT (OUTPATIENT)
Age: 38
End: 2023-07-24

## 2023-07-24 PROCEDURE — 76819 FETAL BIOPHYS PROFIL W/O NST: CPT

## 2023-07-25 ENCOUNTER — APPOINTMENT (OUTPATIENT)
Dept: OBGYN | Facility: CLINIC | Age: 38
End: 2023-07-25
Payer: COMMERCIAL

## 2023-07-25 ENCOUNTER — LABORATORY RESULT (OUTPATIENT)
Age: 38
End: 2023-07-25

## 2023-07-25 VITALS
SYSTOLIC BLOOD PRESSURE: 140 MMHG | HEIGHT: 67 IN | WEIGHT: 193 LBS | BODY MASS INDEX: 30.29 KG/M2 | DIASTOLIC BLOOD PRESSURE: 70 MMHG

## 2023-07-25 DIAGNOSIS — Z34.93 ENCOUNTER FOR SUPERVISION OF NORMAL PREGNANCY, UNSPECIFIED, THIRD TRIMESTER: ICD-10-CM

## 2023-07-25 PROCEDURE — 0502F SUBSEQUENT PRENATAL CARE: CPT

## 2023-07-27 ENCOUNTER — NON-APPOINTMENT (OUTPATIENT)
Age: 38
End: 2023-07-27

## 2023-07-27 LAB
GP B STREP DNA SPEC QL NAA+PROBE: DETECTED
SOURCE GBS: NORMAL

## 2023-07-28 ENCOUNTER — APPOINTMENT (OUTPATIENT)
Dept: OBGYN | Facility: CLINIC | Age: 38
End: 2023-07-28
Payer: COMMERCIAL

## 2023-07-28 VITALS — SYSTOLIC BLOOD PRESSURE: 130 MMHG | DIASTOLIC BLOOD PRESSURE: 64 MMHG

## 2023-07-28 PROCEDURE — 0502F SUBSEQUENT PRENATAL CARE: CPT

## 2023-07-31 ENCOUNTER — ASOB RESULT (OUTPATIENT)
Age: 38
End: 2023-07-31

## 2023-07-31 ENCOUNTER — APPOINTMENT (OUTPATIENT)
Dept: ANTEPARTUM | Facility: CLINIC | Age: 38
End: 2023-07-31
Payer: COMMERCIAL

## 2023-07-31 ENCOUNTER — APPOINTMENT (OUTPATIENT)
Dept: OBGYN | Facility: CLINIC | Age: 38
End: 2023-07-31
Payer: COMMERCIAL

## 2023-07-31 PROCEDURE — 0502F SUBSEQUENT PRENATAL CARE: CPT

## 2023-07-31 PROCEDURE — 76819 FETAL BIOPHYS PROFIL W/O NST: CPT

## 2023-08-04 ENCOUNTER — APPOINTMENT (OUTPATIENT)
Dept: OBGYN | Facility: CLINIC | Age: 38
End: 2023-08-04
Payer: COMMERCIAL

## 2023-08-04 PROCEDURE — 0502F SUBSEQUENT PRENATAL CARE: CPT

## 2023-08-07 ENCOUNTER — APPOINTMENT (OUTPATIENT)
Dept: ANTEPARTUM | Facility: CLINIC | Age: 38
End: 2023-08-07
Payer: COMMERCIAL

## 2023-08-07 ENCOUNTER — ASOB RESULT (OUTPATIENT)
Age: 38
End: 2023-08-07

## 2023-08-07 PROCEDURE — 76819 FETAL BIOPHYS PROFIL W/O NST: CPT

## 2023-08-07 PROCEDURE — ZZZZZ: CPT

## 2023-08-08 ENCOUNTER — APPOINTMENT (OUTPATIENT)
Dept: OBGYN | Facility: CLINIC | Age: 38
End: 2023-08-08
Payer: COMMERCIAL

## 2023-08-08 ENCOUNTER — NON-APPOINTMENT (OUTPATIENT)
Age: 38
End: 2023-08-08

## 2023-08-08 PROCEDURE — 0502F SUBSEQUENT PRENATAL CARE: CPT

## 2023-08-11 ENCOUNTER — APPOINTMENT (OUTPATIENT)
Dept: OBGYN | Facility: CLINIC | Age: 38
End: 2023-08-11
Payer: COMMERCIAL

## 2023-08-11 PROCEDURE — 0502F SUBSEQUENT PRENATAL CARE: CPT

## 2023-08-14 ENCOUNTER — ASOB RESULT (OUTPATIENT)
Age: 38
End: 2023-08-14

## 2023-08-14 ENCOUNTER — APPOINTMENT (OUTPATIENT)
Dept: ANTEPARTUM | Facility: CLINIC | Age: 38
End: 2023-08-14
Payer: COMMERCIAL

## 2023-08-14 ENCOUNTER — APPOINTMENT (OUTPATIENT)
Dept: OBGYN | Facility: CLINIC | Age: 38
End: 2023-08-14
Payer: COMMERCIAL

## 2023-08-14 PROCEDURE — 76816 OB US FOLLOW-UP PER FETUS: CPT

## 2023-08-14 PROCEDURE — 0502F SUBSEQUENT PRENATAL CARE: CPT

## 2023-08-17 ENCOUNTER — APPOINTMENT (OUTPATIENT)
Dept: OBGYN | Facility: HOSPITAL | Age: 38
End: 2023-08-17

## 2023-08-17 ENCOUNTER — APPOINTMENT (OUTPATIENT)
Dept: OBGYN | Facility: CLINIC | Age: 38
End: 2023-08-17

## 2023-08-17 ENCOUNTER — APPOINTMENT (OUTPATIENT)
Dept: OBGYN | Facility: HOSPITAL | Age: 38
End: 2023-08-17
Payer: COMMERCIAL

## 2023-08-17 ENCOUNTER — RESULT REVIEW (OUTPATIENT)
Age: 38
End: 2023-08-17

## 2023-08-17 PROCEDURE — ZZZZZ: CPT

## 2023-08-19 DIAGNOSIS — G89.18 OTHER ACUTE POSTPROCEDURAL PAIN: ICD-10-CM

## 2023-08-19 RX ORDER — OXYCODONE AND ACETAMINOPHEN 5; 325 MG/1; MG/1
5-325 TABLET ORAL
Qty: 30 | Refills: 0 | Status: ACTIVE | COMMUNITY
Start: 2023-08-19 | End: 1900-01-01

## 2023-08-24 ENCOUNTER — APPOINTMENT (OUTPATIENT)
Dept: OBGYN | Facility: CLINIC | Age: 38
End: 2023-08-24
Payer: COMMERCIAL

## 2023-08-24 VITALS
HEIGHT: 67 IN | WEIGHT: 177 LBS | SYSTOLIC BLOOD PRESSURE: 140 MMHG | BODY MASS INDEX: 27.78 KG/M2 | DIASTOLIC BLOOD PRESSURE: 76 MMHG

## 2023-08-24 PROCEDURE — 99212 OFFICE O/P EST SF 10 MIN: CPT

## 2023-08-24 NOTE — HISTORY OF PRESENT ILLNESS
[Postpartum Follow Up] : postpartum follow up [Primary C/S] : delivered by  section [Male] : Delivery History: baby boy [Wt. ___] : weighing [unfilled] [None] : No associated symptoms are reported [Clean/Dry/Intact] : clean, dry and intact [Mild] : mild vaginal bleeding [Not Done] : Examination of breasts not done [Doing Well] : is doing well [No Sign of Infection] : is showing no signs of infection [Excellent Pain Control] : has excellent pain control [Staples Removed] : staples were removed [de-identified] : f/u 5 weeks for ppexam or prn

## 2023-10-04 ENCOUNTER — APPOINTMENT (OUTPATIENT)
Dept: OBGYN | Facility: CLINIC | Age: 38
End: 2023-10-04
Payer: COMMERCIAL

## 2023-10-04 VITALS
BODY MASS INDEX: 27.47 KG/M2 | DIASTOLIC BLOOD PRESSURE: 88 MMHG | WEIGHT: 175 LBS | SYSTOLIC BLOOD PRESSURE: 144 MMHG | HEIGHT: 67 IN

## 2023-10-04 PROCEDURE — 0503F POSTPARTUM CARE VISIT: CPT

## 2023-12-12 ENCOUNTER — APPOINTMENT (OUTPATIENT)
Dept: OBGYN | Facility: CLINIC | Age: 38
End: 2023-12-12